# Patient Record
Sex: FEMALE | Race: WHITE | NOT HISPANIC OR LATINO | Employment: FULL TIME | URBAN - METROPOLITAN AREA
[De-identification: names, ages, dates, MRNs, and addresses within clinical notes are randomized per-mention and may not be internally consistent; named-entity substitution may affect disease eponyms.]

---

## 2023-03-15 ENCOUNTER — OFFICE VISIT (OUTPATIENT)
Dept: OBSTETRICS AND GYNECOLOGY | Facility: CLINIC | Age: 38
End: 2023-03-15
Payer: COMMERCIAL

## 2023-03-15 VITALS — WEIGHT: 111.56 LBS | SYSTOLIC BLOOD PRESSURE: 104 MMHG | DIASTOLIC BLOOD PRESSURE: 70 MMHG

## 2023-03-15 DIAGNOSIS — Z11.51 ENCOUNTER FOR SCREENING FOR HUMAN PAPILLOMAVIRUS (HPV): ICD-10-CM

## 2023-03-15 DIAGNOSIS — Z01.419 ENCOUNTER FOR ANNUAL ROUTINE GYNECOLOGICAL EXAMINATION: Primary | ICD-10-CM

## 2023-03-15 DIAGNOSIS — Z12.4 ENCOUNTER FOR PAPANICOLAOU SMEAR FOR CERVICAL CANCER SCREENING: ICD-10-CM

## 2023-03-15 DIAGNOSIS — Z00.00 HEALTH CARE MAINTENANCE: ICD-10-CM

## 2023-03-15 PROCEDURE — 1160F PR REVIEW ALL MEDS BY PRESCRIBER/CLIN PHARMACIST DOCUMENTED: ICD-10-PCS | Mod: CPTII,S$GLB,, | Performed by: OBSTETRICS & GYNECOLOGY

## 2023-03-15 PROCEDURE — 3074F PR MOST RECENT SYSTOLIC BLOOD PRESSURE < 130 MM HG: ICD-10-PCS | Mod: CPTII,S$GLB,, | Performed by: OBSTETRICS & GYNECOLOGY

## 2023-03-15 PROCEDURE — 3074F SYST BP LT 130 MM HG: CPT | Mod: CPTII,S$GLB,, | Performed by: OBSTETRICS & GYNECOLOGY

## 2023-03-15 PROCEDURE — 1159F MED LIST DOCD IN RCRD: CPT | Mod: CPTII,S$GLB,, | Performed by: OBSTETRICS & GYNECOLOGY

## 2023-03-15 PROCEDURE — 1160F RVW MEDS BY RX/DR IN RCRD: CPT | Mod: CPTII,S$GLB,, | Performed by: OBSTETRICS & GYNECOLOGY

## 2023-03-15 PROCEDURE — 99999 PR PBB SHADOW E&M-NEW PATIENT-LVL III: ICD-10-PCS | Mod: PBBFAC,,, | Performed by: OBSTETRICS & GYNECOLOGY

## 2023-03-15 PROCEDURE — 99385 PREV VISIT NEW AGE 18-39: CPT | Mod: S$GLB,,, | Performed by: OBSTETRICS & GYNECOLOGY

## 2023-03-15 PROCEDURE — 99385 PR PREVENTIVE VISIT,NEW,18-39: ICD-10-PCS | Mod: S$GLB,,, | Performed by: OBSTETRICS & GYNECOLOGY

## 2023-03-15 PROCEDURE — 99999 PR PBB SHADOW E&M-NEW PATIENT-LVL III: CPT | Mod: PBBFAC,,, | Performed by: OBSTETRICS & GYNECOLOGY

## 2023-03-15 PROCEDURE — 87624 HPV HI-RISK TYP POOLED RSLT: CPT | Performed by: OBSTETRICS & GYNECOLOGY

## 2023-03-15 PROCEDURE — 3078F DIAST BP <80 MM HG: CPT | Mod: CPTII,S$GLB,, | Performed by: OBSTETRICS & GYNECOLOGY

## 2023-03-15 PROCEDURE — 3078F PR MOST RECENT DIASTOLIC BLOOD PRESSURE < 80 MM HG: ICD-10-PCS | Mod: CPTII,S$GLB,, | Performed by: OBSTETRICS & GYNECOLOGY

## 2023-03-15 PROCEDURE — 1159F PR MEDICATION LIST DOCUMENTED IN MEDICAL RECORD: ICD-10-PCS | Mod: CPTII,S$GLB,, | Performed by: OBSTETRICS & GYNECOLOGY

## 2023-03-15 PROCEDURE — 88175 CYTOPATH C/V AUTO FLUID REDO: CPT | Performed by: OBSTETRICS & GYNECOLOGY

## 2023-03-15 NOTE — PROGRESS NOTES
Chief Complaint: Well Woman Exam     HPI:      Opal Marx is a 37 y.o.  who presents today for well woman exam.  LMP: Patient's last menstrual period was 2023.  No issues, problems, or complaints. Specifically, patient denies abnormal vaginal bleeding, discharge, pelvic pain, urinary problems, or changes in appetite. Ms. Marx is currently sexually active with a single male partner. She declines STD screening today. Patient has regular monthly menses. Patient's last menstrual period was 2023. She is currently using condoms for contraception.     Previous Pap: no abnormalities  (3/21/2023) Last done at . No records to review today.    Gardasil: Has never had, counseled    OB History          2    Para   2    Term   2            AB        Living   2         SAB        IAB        Ectopic        Multiple        Live Births   2           Obstetric Comments   Largest 7lb4oz               GYN History  Age of Menarche:15  Age at first pregnancy:26   Age at first live birth:26  Number of months breastfeedin   Comments: No abnormal pap or STDs       ROS:     GENERAL: Denies unintentional weight gain or weight loss. Feeling well overall.   SKIN: Denies rash or lesions.   HEENT: Denies headaches, or vision changes.   CARDIOVASCULAR: Denies palpitations or chest pain.   RESPIRATORY: Denies shortness of breath or dyspnea on exertion.  BREASTS: Denies pain, lumps, or nipple discharge.   ABDOMEN: Denies abdominal pain, constipation, diarrhea, nausea, vomiting, change in appetite.  URINARY: Denies frequency, dysuria, hematuria.  NEUROLOGIC: Denies syncope or weakness.   PSYCHIATRIC: Denies depression, anxiety or mood swings.    Physical Exam:      PHYSICAL EXAM:  /70 (BP Location: Right arm, Patient Position: Sitting, BP Method: Medium (Manual))   Wt 50.6 kg (111 lb 8.8 oz)   LMP 2023   There is no height or weight on file to calculate BMI.     APPEARANCE: Well nourished,  well developed, in no acute distress.  PSYCH: Appropriate mood and affect.  SKIN: No acne or hirsutism  NECK: Neck symmetric without masses or thyromegaly  NODES: No inguinal, axillary, or supraclavicular lymph node enlargement  ABDOMEN: Soft.  No tenderness or masses.    CARDIOVASCULAR: No edema of peripheral extremities  BREASTS: Symmetrical, no skin changes or visible lesions.  No palpable masses or nipple discharge bilaterally.  PELVIC: Normal external genitalia without lesions.  Normal hair distribution.  Adequate perineal body, normal urethral meatus.  Vagina moist and well rugated without lesions or discharge.  Cervix pink, without lesions, discharge or tenderness.  No significant cystocele or rectocele.  Bimanual exam shows uterus to be normal size, regular, mobile and nontender.  Adnexa without masses or tenderness.      Assessment/Plan:     Encounter for annual routine gynecological examination    Encounter for Papanicolaou smear for cervical cancer screening  -     Liquid-Based Pap Smear, Screening    Encounter for screening for human papillomavirus (HPV)  -     HPV High Risk Genotypes, PCR    Health care maintenance  -     CBC Auto Differential; Future; Expected date: 03/15/2023  -     Comprehensive Metabolic Panel; Future; Expected date: 03/15/2023  -     Lipid Panel; Future; Expected date: 03/15/2023  -     TSH; Future; Expected date: 03/15/2023      RTC 1 year    Counseling:     Patient was counseled today on current ASCCP pap guidelines, the recommendation for yearly pelvic exams, healthy diet and exercise routines, breast self awareness.She is to see her PCP for other health maintenance.     Use of the [facility] Patient Portal discussed and encouraged during today's visit.       Krissy Max MD      As of April 1, 2021, the Cures Act has been passed nationally. This new law requires that all doctors progress notes, lab results, pathology reports and radiology reports be released IMMEDIATELY to the  patient in the patient portal. That means that the results are released to you at the EXACT same time they are released to me. Therefore, with all of the patients that I have I am not able to reply to each patient exactly when the results come in. So there will be a delay from when you see the results to when I see them and have time to come up with a response to send you. Also I only see these results when I am on the computer at work. So if the results come in over the weekend or after 5 pm of a work day, I will not see them until the next business day. As you can tell, this is a challenge as a physician to give every patient the quick response they hope for and deserve. So please be patient! Thanks for understanding, Dr. Max

## 2023-03-16 ENCOUNTER — LAB VISIT (OUTPATIENT)
Dept: LAB | Facility: HOSPITAL | Age: 38
End: 2023-03-16
Attending: OBSTETRICS & GYNECOLOGY
Payer: COMMERCIAL

## 2023-03-16 DIAGNOSIS — Z00.00 HEALTH CARE MAINTENANCE: ICD-10-CM

## 2023-03-16 LAB
ALBUMIN SERPL BCP-MCNC: 4.5 G/DL (ref 3.5–5.2)
ALP SERPL-CCNC: 57 U/L (ref 55–135)
ALT SERPL W/O P-5'-P-CCNC: 9 U/L (ref 10–44)
ANION GAP SERPL CALC-SCNC: 8 MMOL/L (ref 8–16)
AST SERPL-CCNC: 20 U/L (ref 10–40)
BASOPHILS # BLD AUTO: 0.03 K/UL (ref 0–0.2)
BASOPHILS NFR BLD: 0.6 % (ref 0–1.9)
BILIRUB SERPL-MCNC: 0.7 MG/DL (ref 0.1–1)
BUN SERPL-MCNC: 9 MG/DL (ref 6–20)
CALCIUM SERPL-MCNC: 10.7 MG/DL (ref 8.7–10.5)
CHLORIDE SERPL-SCNC: 105 MMOL/L (ref 95–110)
CHOLEST SERPL-MCNC: 230 MG/DL (ref 120–199)
CHOLEST/HDLC SERPL: 2.7 {RATIO} (ref 2–5)
CO2 SERPL-SCNC: 26 MMOL/L (ref 23–29)
CREAT SERPL-MCNC: 0.7 MG/DL (ref 0.5–1.4)
DIFFERENTIAL METHOD: NORMAL
EOSINOPHIL # BLD AUTO: 0.1 K/UL (ref 0–0.5)
EOSINOPHIL NFR BLD: 1.5 % (ref 0–8)
ERYTHROCYTE [DISTWIDTH] IN BLOOD BY AUTOMATED COUNT: 12.5 % (ref 11.5–14.5)
EST. GFR  (NO RACE VARIABLE): >60 ML/MIN/1.73 M^2
GLUCOSE SERPL-MCNC: 81 MG/DL (ref 70–110)
HCT VFR BLD AUTO: 43.3 % (ref 37–48.5)
HDLC SERPL-MCNC: 85 MG/DL (ref 40–75)
HDLC SERPL: 37 % (ref 20–50)
HGB BLD-MCNC: 14.4 G/DL (ref 12–16)
IMM GRANULOCYTES # BLD AUTO: 0.01 K/UL (ref 0–0.04)
IMM GRANULOCYTES NFR BLD AUTO: 0.2 % (ref 0–0.5)
LDLC SERPL CALC-MCNC: 132.4 MG/DL (ref 63–159)
LYMPHOCYTES # BLD AUTO: 1.9 K/UL (ref 1–4.8)
LYMPHOCYTES NFR BLD: 35.5 % (ref 18–48)
MCH RBC QN AUTO: 30.7 PG (ref 27–31)
MCHC RBC AUTO-ENTMCNC: 33.3 G/DL (ref 32–36)
MCV RBC AUTO: 92 FL (ref 82–98)
MONOCYTES # BLD AUTO: 0.4 K/UL (ref 0.3–1)
MONOCYTES NFR BLD: 6.7 % (ref 4–15)
NEUTROPHILS # BLD AUTO: 2.9 K/UL (ref 1.8–7.7)
NEUTROPHILS NFR BLD: 55.5 % (ref 38–73)
NONHDLC SERPL-MCNC: 145 MG/DL
NRBC BLD-RTO: 0 /100 WBC
PLATELET # BLD AUTO: 295 K/UL (ref 150–450)
PMV BLD AUTO: 11.5 FL (ref 9.2–12.9)
POTASSIUM SERPL-SCNC: 4.2 MMOL/L (ref 3.5–5.1)
PROT SERPL-MCNC: 7.5 G/DL (ref 6–8.4)
RBC # BLD AUTO: 4.69 M/UL (ref 4–5.4)
SODIUM SERPL-SCNC: 139 MMOL/L (ref 136–145)
TRIGL SERPL-MCNC: 63 MG/DL (ref 30–150)
TSH SERPL DL<=0.005 MIU/L-ACNC: 2.35 UIU/ML (ref 0.4–4)
WBC # BLD AUTO: 5.24 K/UL (ref 3.9–12.7)

## 2023-03-16 PROCEDURE — 80053 COMPREHEN METABOLIC PANEL: CPT | Performed by: OBSTETRICS & GYNECOLOGY

## 2023-03-16 PROCEDURE — 84443 ASSAY THYROID STIM HORMONE: CPT | Performed by: OBSTETRICS & GYNECOLOGY

## 2023-03-16 PROCEDURE — 85025 COMPLETE CBC W/AUTO DIFF WBC: CPT | Performed by: OBSTETRICS & GYNECOLOGY

## 2023-03-16 PROCEDURE — 80061 LIPID PANEL: CPT | Performed by: OBSTETRICS & GYNECOLOGY

## 2023-03-17 NOTE — PROGRESS NOTES
Please notify that her labs are overall normal. The only thing slightly elevated is her calcium level. Please find out if she is taking any supplements or vitamins with calcium in them and let me know.  Dr. Max

## 2023-03-21 LAB
FINAL PATHOLOGIC DIAGNOSIS: NORMAL
HPV HR 12 DNA SPEC QL NAA+PROBE: NEGATIVE
HPV16 AG SPEC QL: NEGATIVE
HPV18 DNA SPEC QL NAA+PROBE: NEGATIVE
Lab: NORMAL

## 2023-03-22 ENCOUNTER — TELEPHONE (OUTPATIENT)
Dept: OBSTETRICS AND GYNECOLOGY | Facility: CLINIC | Age: 38
End: 2023-03-22
Payer: COMMERCIAL

## 2023-03-22 DIAGNOSIS — Z23 NEED FOR TDAP VACCINATION: Primary | ICD-10-CM

## 2023-03-22 NOTE — TELEPHONE ENCOUNTER
Called and asked if pt was taking anything with Calcium.  Pt is not currently taking anything but mentioned her mother had kidney stones due to elevated Calcium levels.  Also notified pt Pap was normal and neg for HPV.  Pt inquired about iron levels and I advised that blood levels were within range.    Advised that if Dr. Max has any other recommendations regarding elevated Calcium, will let pt know.  Pt verbalized understanding.

## 2023-03-22 NOTE — TELEPHONE ENCOUNTER
"Pt called back to further clarify that her mother's kidney stones was a "side effect" of something more severe.  States her mother was diagnosed with Hyperparathyroidism which is an overactive gland that produces too much Calcium.  Her mother had sx to remove 1-2 of her parathyroid gland last year.    Advised I will keep in touch with her if Dr. Mxa has any further recommendations.  "

## 2023-03-22 NOTE — TELEPHONE ENCOUNTER
----- Message from Krissy Max MD sent at 3/17/2023  5:02 PM CDT -----  Please notify that her labs are overall normal. The only thing slightly elevated is her calcium level. Please find out if she is taking any supplements or vitamins with calcium in them and let me know.  Dr. Max

## 2023-08-24 ENCOUNTER — OFFICE VISIT (OUTPATIENT)
Dept: PRIMARY CARE CLINIC | Facility: CLINIC | Age: 38
End: 2023-08-24
Payer: COMMERCIAL

## 2023-08-24 ENCOUNTER — LAB VISIT (OUTPATIENT)
Dept: LAB | Facility: HOSPITAL | Age: 38
End: 2023-08-24
Attending: STUDENT IN AN ORGANIZED HEALTH CARE EDUCATION/TRAINING PROGRAM
Payer: COMMERCIAL

## 2023-08-24 VITALS
HEART RATE: 66 BPM | BODY MASS INDEX: 19.84 KG/M2 | HEIGHT: 62 IN | OXYGEN SATURATION: 100 % | SYSTOLIC BLOOD PRESSURE: 112 MMHG | WEIGHT: 107.81 LBS | DIASTOLIC BLOOD PRESSURE: 80 MMHG

## 2023-08-24 DIAGNOSIS — D50.9 IRON DEFICIENCY ANEMIA, UNSPECIFIED IRON DEFICIENCY ANEMIA TYPE: ICD-10-CM

## 2023-08-24 DIAGNOSIS — Z82.49 FAMILY HISTORY OF HEART DISEASE: ICD-10-CM

## 2023-08-24 DIAGNOSIS — Z00.00 ANNUAL PHYSICAL EXAM: Primary | ICD-10-CM

## 2023-08-24 DIAGNOSIS — E83.52 SERUM CALCIUM ELEVATED: ICD-10-CM

## 2023-08-24 DIAGNOSIS — Z00.00 ANNUAL PHYSICAL EXAM: ICD-10-CM

## 2023-08-24 DIAGNOSIS — F41.8 SITUATIONAL ANXIETY: ICD-10-CM

## 2023-08-24 LAB
ALBUMIN SERPL BCP-MCNC: 4.6 G/DL (ref 3.5–5.2)
ALP SERPL-CCNC: 65 U/L (ref 55–135)
ALT SERPL W/O P-5'-P-CCNC: 14 U/L (ref 10–44)
ANION GAP SERPL CALC-SCNC: 10 MMOL/L (ref 8–16)
AST SERPL-CCNC: 22 U/L (ref 10–40)
BILIRUB SERPL-MCNC: 0.6 MG/DL (ref 0.1–1)
BUN SERPL-MCNC: 9 MG/DL (ref 6–20)
CALCIUM SERPL-MCNC: 10.8 MG/DL (ref 8.7–10.5)
CHLORIDE SERPL-SCNC: 103 MMOL/L (ref 95–110)
CO2 SERPL-SCNC: 27 MMOL/L (ref 23–29)
CREAT SERPL-MCNC: 0.7 MG/DL (ref 0.5–1.4)
EST. GFR  (NO RACE VARIABLE): >60 ML/MIN/1.73 M^2
ESTIMATED AVG GLUCOSE: 77 MG/DL (ref 68–131)
GLUCOSE SERPL-MCNC: 77 MG/DL (ref 70–110)
HBA1C MFR BLD: 4.3 % (ref 4–5.6)
IRON SERPL-MCNC: 125 UG/DL (ref 30–160)
POTASSIUM SERPL-SCNC: 4.2 MMOL/L (ref 3.5–5.1)
PROT SERPL-MCNC: 7.8 G/DL (ref 6–8.4)
PTH-INTACT SERPL-MCNC: 77.1 PG/ML (ref 9–77)
SATURATED IRON: 29 % (ref 20–50)
SODIUM SERPL-SCNC: 140 MMOL/L (ref 136–145)
TOTAL IRON BINDING CAPACITY: 428 UG/DL (ref 250–450)
TRANSFERRIN SERPL-MCNC: 289 MG/DL (ref 200–375)

## 2023-08-24 PROCEDURE — 82397 CHEMILUMINESCENT ASSAY: CPT | Performed by: STUDENT IN AN ORGANIZED HEALTH CARE EDUCATION/TRAINING PROGRAM

## 2023-08-24 PROCEDURE — 3008F BODY MASS INDEX DOCD: CPT | Mod: CPTII,S$GLB,, | Performed by: STUDENT IN AN ORGANIZED HEALTH CARE EDUCATION/TRAINING PROGRAM

## 2023-08-24 PROCEDURE — 1159F PR MEDICATION LIST DOCUMENTED IN MEDICAL RECORD: ICD-10-PCS | Mod: CPTII,S$GLB,, | Performed by: STUDENT IN AN ORGANIZED HEALTH CARE EDUCATION/TRAINING PROGRAM

## 2023-08-24 PROCEDURE — 99395 PREV VISIT EST AGE 18-39: CPT | Mod: S$GLB,,, | Performed by: STUDENT IN AN ORGANIZED HEALTH CARE EDUCATION/TRAINING PROGRAM

## 2023-08-24 PROCEDURE — 3074F SYST BP LT 130 MM HG: CPT | Mod: CPTII,S$GLB,, | Performed by: STUDENT IN AN ORGANIZED HEALTH CARE EDUCATION/TRAINING PROGRAM

## 2023-08-24 PROCEDURE — 83970 ASSAY OF PARATHORMONE: CPT | Performed by: STUDENT IN AN ORGANIZED HEALTH CARE EDUCATION/TRAINING PROGRAM

## 2023-08-24 PROCEDURE — 3008F PR BODY MASS INDEX (BMI) DOCUMENTED: ICD-10-PCS | Mod: CPTII,S$GLB,, | Performed by: STUDENT IN AN ORGANIZED HEALTH CARE EDUCATION/TRAINING PROGRAM

## 2023-08-24 PROCEDURE — 99999 PR PBB SHADOW E&M-EST. PATIENT-LVL III: ICD-10-PCS | Mod: PBBFAC,,, | Performed by: STUDENT IN AN ORGANIZED HEALTH CARE EDUCATION/TRAINING PROGRAM

## 2023-08-24 PROCEDURE — 1160F RVW MEDS BY RX/DR IN RCRD: CPT | Mod: CPTII,S$GLB,, | Performed by: STUDENT IN AN ORGANIZED HEALTH CARE EDUCATION/TRAINING PROGRAM

## 2023-08-24 PROCEDURE — 3044F HG A1C LEVEL LT 7.0%: CPT | Mod: CPTII,S$GLB,, | Performed by: STUDENT IN AN ORGANIZED HEALTH CARE EDUCATION/TRAINING PROGRAM

## 2023-08-24 PROCEDURE — 3074F PR MOST RECENT SYSTOLIC BLOOD PRESSURE < 130 MM HG: ICD-10-PCS | Mod: CPTII,S$GLB,, | Performed by: STUDENT IN AN ORGANIZED HEALTH CARE EDUCATION/TRAINING PROGRAM

## 2023-08-24 PROCEDURE — 1160F PR REVIEW ALL MEDS BY PRESCRIBER/CLIN PHARMACIST DOCUMENTED: ICD-10-PCS | Mod: CPTII,S$GLB,, | Performed by: STUDENT IN AN ORGANIZED HEALTH CARE EDUCATION/TRAINING PROGRAM

## 2023-08-24 PROCEDURE — 36415 COLL VENOUS BLD VENIPUNCTURE: CPT | Performed by: STUDENT IN AN ORGANIZED HEALTH CARE EDUCATION/TRAINING PROGRAM

## 2023-08-24 PROCEDURE — 83540 ASSAY OF IRON: CPT | Performed by: STUDENT IN AN ORGANIZED HEALTH CARE EDUCATION/TRAINING PROGRAM

## 2023-08-24 PROCEDURE — 84466 ASSAY OF TRANSFERRIN: CPT | Performed by: STUDENT IN AN ORGANIZED HEALTH CARE EDUCATION/TRAINING PROGRAM

## 2023-08-24 PROCEDURE — 99395 PR PREVENTIVE VISIT,EST,18-39: ICD-10-PCS | Mod: S$GLB,,, | Performed by: STUDENT IN AN ORGANIZED HEALTH CARE EDUCATION/TRAINING PROGRAM

## 2023-08-24 PROCEDURE — 80053 COMPREHEN METABOLIC PANEL: CPT | Performed by: STUDENT IN AN ORGANIZED HEALTH CARE EDUCATION/TRAINING PROGRAM

## 2023-08-24 PROCEDURE — 3079F DIAST BP 80-89 MM HG: CPT | Mod: CPTII,S$GLB,, | Performed by: STUDENT IN AN ORGANIZED HEALTH CARE EDUCATION/TRAINING PROGRAM

## 2023-08-24 PROCEDURE — 99999 PR PBB SHADOW E&M-EST. PATIENT-LVL III: CPT | Mod: PBBFAC,,, | Performed by: STUDENT IN AN ORGANIZED HEALTH CARE EDUCATION/TRAINING PROGRAM

## 2023-08-24 PROCEDURE — 3079F PR MOST RECENT DIASTOLIC BLOOD PRESSURE 80-89 MM HG: ICD-10-PCS | Mod: CPTII,S$GLB,, | Performed by: STUDENT IN AN ORGANIZED HEALTH CARE EDUCATION/TRAINING PROGRAM

## 2023-08-24 PROCEDURE — 1159F MED LIST DOCD IN RCRD: CPT | Mod: CPTII,S$GLB,, | Performed by: STUDENT IN AN ORGANIZED HEALTH CARE EDUCATION/TRAINING PROGRAM

## 2023-08-24 PROCEDURE — 3044F PR MOST RECENT HEMOGLOBIN A1C LEVEL <7.0%: ICD-10-PCS | Mod: CPTII,S$GLB,, | Performed by: STUDENT IN AN ORGANIZED HEALTH CARE EDUCATION/TRAINING PROGRAM

## 2023-08-24 PROCEDURE — 83036 HEMOGLOBIN GLYCOSYLATED A1C: CPT | Performed by: STUDENT IN AN ORGANIZED HEALTH CARE EDUCATION/TRAINING PROGRAM

## 2023-08-24 RX ORDER — PROPRANOLOL HYDROCHLORIDE 10 MG/1
10 TABLET ORAL 3 TIMES DAILY PRN
Qty: 90 TABLET | Refills: 11 | Status: SHIPPED | OUTPATIENT
Start: 2023-08-24 | End: 2024-08-23

## 2023-08-24 NOTE — PROGRESS NOTES
SUBJECTIVE     Chief Complaint   Patient presents with    Establish Care    Annual Exam       HPI  Opal Marx is a 38 y.o. female with medical diagnoses as listed in the medical history and problem list that presents for annual exam. Pt is establishing care with me today.    Pt is UTD on age appropriate CA screening.    Family, social, surgical Hx reviewed     ESTHELA: due for repeat level    Situational anxiety: Interested in prn medication for stressful circumstances. Does not experience daily sxs.    Serum Ca elevated on recent labwork     Health Maintenance         Date Due Completion Date    Hepatitis C Screening Never done ---    HIV Screening Never done ---    TETANUS VACCINE 08/09/2014 8/9/2004    COVID-19 Vaccine (2 - Booster for Lryic series) 10/09/2021 8/14/2021    Influenza Vaccine (1) 09/01/2023 ---    Cervical Cancer Screening 03/15/2028 3/15/2023            PAST MEDICAL HISTORY:  History reviewed. No pertinent past medical history.    PAST SURGICAL HISTORY:  Past Surgical History:   Procedure Laterality Date    DILATION AND CURETTAGE OF UTERUS  2014       SOCIAL HISTORY:  Social History     Socioeconomic History    Marital status:    Occupational History    Occupation:    Tobacco Use    Smoking status: Never     Passive exposure: Never    Smokeless tobacco: Never   Substance and Sexual Activity    Alcohol use: Yes    Drug use: Never    Sexual activity: Yes     Partners: Male     Birth control/protection: None       FAMILY HISTORY:  Family History   Problem Relation Age of Onset    Hyperparathyroidism Mother     Nephrolithiasis Mother     Heart attack Father 48    Prostate cancer Maternal Grandfather         started as prostate mets to colon    Colon cancer Maternal Grandfather     Crohn's disease Cousin     Breast cancer Neg Hx     Ovarian cancer Neg Hx     Uterine cancer Neg Hx        ALLERGIES AND MEDICATIONS: updated and reviewed.  Review of patient's allergies  "indicates:   Allergen Reactions    Meperidine Rash     Current Outpatient Medications   Medication Sig Dispense Refill    propranoloL (INDERAL) 10 MG tablet Take 1 tablet (10 mg total) by mouth 3 (three) times daily as needed (anxiety). 90 tablet 11     No current facility-administered medications for this visit.       ROS  Review of Systems   Constitutional:  Negative for fever and weight loss.   Respiratory:  Negative for cough and shortness of breath.    Cardiovascular:  Negative for chest pain and palpitations.   Gastrointestinal:  Negative for abdominal pain, constipation, diarrhea, nausea and vomiting.   Genitourinary:  Negative for dysuria.   Musculoskeletal:  Negative for back pain and joint pain.   Skin:  Negative for rash.   Neurological:  Negative for dizziness, weakness and headaches.   Psychiatric/Behavioral:  Negative for depression. The patient is not nervous/anxious.          OBJECTIVE     Physical Exam  Vitals:    08/24/23 1029   BP: 112/80   Pulse: 66    Body mass index is 19.72 kg/m².  Weight: 48.9 kg (107 lb 12.9 oz)   Height: 5' 2" (157.5 cm)     Physical Exam  HENT:      Head: Normocephalic and atraumatic.      Nose: Nose normal.      Mouth/Throat:      Mouth: Mucous membranes are moist.      Pharynx: Oropharynx is clear.   Eyes:      Extraocular Movements: Extraocular movements intact.      Conjunctiva/sclera: Conjunctivae normal.      Pupils: Pupils are equal, round, and reactive to light.   Cardiovascular:      Rate and Rhythm: Normal rate and regular rhythm.   Pulmonary:      Effort: Pulmonary effort is normal.      Breath sounds: Normal breath sounds.   Musculoskeletal:         General: No swelling. Normal range of motion.      Cervical back: Normal range of motion.      Right lower leg: No edema.      Left lower leg: No edema.   Skin:     General: Skin is warm.      Findings: No lesion or rash.   Neurological:      General: No focal deficit present.      Mental Status: She is alert and " oriented to person, place, and time.      Motor: No weakness.   Psychiatric:         Mood and Affect: Mood normal.         Thought Content: Thought content normal.               ASSESSMENT     38 y.o. female with     1. Annual physical exam    2. Serum calcium elevated    3. Family history of heart disease    4. Iron deficiency anemia, unspecified iron deficiency anemia type    5. Situational anxiety        PLAN:     1. Annual physical exam  -     COMPREHENSIVE METABOLIC PANEL; Future; Expected date: 08/24/2023    2. Serum calcium elevated  -     COMPREHENSIVE METABOLIC PANEL; Future; Expected date: 08/24/2023  -     PTH, intact; Future; Expected date: 08/24/2023  -     PTH-RELATED PEPTIDE; Future; Expected date: 08/24/2023    3. Family history of heart disease  -     HEMOGLOBIN A1C; Future; Expected date: 08/24/2023    4. Iron deficiency anemia, unspecified iron deficiency anemia type  -     Iron and TIBC; Future; Expected date: 08/24/2023    5. Situational anxiety  -     propranoloL (INDERAL) 10 MG tablet; Take 1 tablet (10 mg total) by mouth 3 (three) times daily as needed (anxiety).  Dispense: 90 tablet; Refill: 11        Discussed age and gender appropriate screenings at this visit and encouraged a healthy diet low in simple carbohydrates, and increased physical activity.  Counseled on medically appropriate vaccines based on age and current health condition.  Screening test reviewed and discussed with patient.      RTC in 1 year     Sakshi Kirkland MD

## 2023-08-30 LAB — PTH RELATED PROT SERPL-SCNC: 0.7 PMOL/L

## 2023-09-06 ENCOUNTER — PATIENT MESSAGE (OUTPATIENT)
Dept: PRIMARY CARE CLINIC | Facility: CLINIC | Age: 38
End: 2023-09-06
Payer: COMMERCIAL

## 2023-09-06 DIAGNOSIS — E83.52 SERUM CALCIUM ELEVATED: Primary | ICD-10-CM

## 2023-09-21 ENCOUNTER — TELEPHONE (OUTPATIENT)
Dept: PRIMARY CARE CLINIC | Facility: CLINIC | Age: 38
End: 2023-09-21
Payer: COMMERCIAL

## 2023-09-21 NOTE — TELEPHONE ENCOUNTER
----- Message from Sakshi Kirkland MD sent at 9/21/2023  2:32 PM CDT -----  Please let her know, if Dr. Hoffmann is not taking new patients we cannot schedule with him.  ----- Message -----  From: Latosha Jean Baptiste MA  Sent: 9/21/2023   2:14 PM CDT  To: Sakshi Kirkland MD    [facility]    ----- Message -----  From: Celia Gonzalez  Sent: 9/21/2023   2:08 PM CDT  To: Isael SALCEDO. Staff    Pt wanted to see Dr. Hoffmann and no one else. I sent a msg to his staff on 9/7. She's waiting on them to call back.

## 2023-09-21 NOTE — TELEPHONE ENCOUNTER
Informed pt that Dr. Hoffmann is not taking new pts, pt verbalized understanding and is okay with scheduling with another endo provider who is taking new pts. Can you help pt get scheduled? Thanks!

## 2024-01-17 ENCOUNTER — OFFICE VISIT (OUTPATIENT)
Dept: ENDOCRINOLOGY | Facility: CLINIC | Age: 39
End: 2024-01-17
Payer: COMMERCIAL

## 2024-01-17 ENCOUNTER — LAB VISIT (OUTPATIENT)
Dept: LAB | Facility: HOSPITAL | Age: 39
End: 2024-01-17
Attending: INTERNAL MEDICINE
Payer: COMMERCIAL

## 2024-01-17 VITALS
OXYGEN SATURATION: 99 % | WEIGHT: 108.56 LBS | SYSTOLIC BLOOD PRESSURE: 124 MMHG | BODY MASS INDEX: 19.98 KG/M2 | DIASTOLIC BLOOD PRESSURE: 82 MMHG | HEIGHT: 62 IN | HEART RATE: 102 BPM

## 2024-01-17 DIAGNOSIS — E21.5 PARATHYROID RELATED HYPERCALCEMIA: Primary | ICD-10-CM

## 2024-01-17 DIAGNOSIS — E83.52 SERUM CALCIUM ELEVATED: ICD-10-CM

## 2024-01-17 DIAGNOSIS — E21.3 HYPERPARATHYROIDISM: ICD-10-CM

## 2024-01-17 DIAGNOSIS — E83.52 PARATHYROID RELATED HYPERCALCEMIA: Primary | ICD-10-CM

## 2024-01-17 LAB
25(OH)D3+25(OH)D2 SERPL-MCNC: 21 NG/ML (ref 30–96)
PHOSPHATE SERPL-MCNC: 2.3 MG/DL (ref 2.7–4.5)
PTH-INTACT SERPL-MCNC: 82 PG/ML (ref 9–77)

## 2024-01-17 PROCEDURE — 36415 COLL VENOUS BLD VENIPUNCTURE: CPT | Performed by: INTERNAL MEDICINE

## 2024-01-17 PROCEDURE — 1159F MED LIST DOCD IN RCRD: CPT | Mod: CPTII,S$GLB,, | Performed by: INTERNAL MEDICINE

## 2024-01-17 PROCEDURE — 82306 VITAMIN D 25 HYDROXY: CPT | Performed by: INTERNAL MEDICINE

## 2024-01-17 PROCEDURE — 1160F RVW MEDS BY RX/DR IN RCRD: CPT | Mod: CPTII,S$GLB,, | Performed by: INTERNAL MEDICINE

## 2024-01-17 PROCEDURE — 99999 PR PBB SHADOW E&M-EST. PATIENT-LVL III: CPT | Mod: PBBFAC,,, | Performed by: INTERNAL MEDICINE

## 2024-01-17 PROCEDURE — 3008F BODY MASS INDEX DOCD: CPT | Mod: CPTII,S$GLB,, | Performed by: INTERNAL MEDICINE

## 2024-01-17 PROCEDURE — 84100 ASSAY OF PHOSPHORUS: CPT | Performed by: INTERNAL MEDICINE

## 2024-01-17 PROCEDURE — 83970 ASSAY OF PARATHORMONE: CPT | Performed by: INTERNAL MEDICINE

## 2024-01-17 PROCEDURE — 3074F SYST BP LT 130 MM HG: CPT | Mod: CPTII,S$GLB,, | Performed by: INTERNAL MEDICINE

## 2024-01-17 PROCEDURE — 99204 OFFICE O/P NEW MOD 45 MIN: CPT | Mod: S$GLB,,, | Performed by: INTERNAL MEDICINE

## 2024-01-17 PROCEDURE — 3079F DIAST BP 80-89 MM HG: CPT | Mod: CPTII,S$GLB,, | Performed by: INTERNAL MEDICINE

## 2024-01-17 NOTE — ASSESSMENT & PLAN NOTE
We will recheck labs today including a 24 hour urine.  Reviewed the differential is primary hyperparathyroidism versus FH H. urine studies will determine next steps which will either be imaging or genetic testing.  Reviewed that based on her age she would be a surgical candidate if primary hyperparathyroidism is proven.

## 2024-01-17 NOTE — PROGRESS NOTES
"Subjective:      Patient ID: Opal Marx is a 38 y.o.    Chief Complaint:  PTH mediated hypercalcemia    History of Present Illness  With regards to the hypercalcemia      Was found to have hypercalcemia on routine labs - first noted:   Pth was checked:  And elevated    She denied current or past lithium use  Denies HCTZ use.    Parathyroid imaging: none   Thyroid ultrasound: none     Daily intake of calcium is: healthy diet   Taking vitamin D: healthy diet     Denies constipation, depression, polyuria     Last bmd was:  none   Denies fractures, height loss or kidney stones      She does note that her mother had primary hyperparathyroidism.  Had kidney stones.  Is status post parathyroidectomy.       ROS:   As above    Objective:     /82   Pulse 102   Ht 5' 2" (1.575 m)   Wt 49.2 kg (108 lb 9.2 oz)   SpO2 99%   BMI 19.86 kg/m²     Body mass index is 19.86 kg/m².      Physical Exam           Lab Review:   Lab Results   Component Value Date    HGBA1C 4.3 08/24/2023     Lab Results   Component Value Date    CHOL 230 (H) 03/16/2023    HDL 85 (H) 03/16/2023    LDLCALC 132.4 03/16/2023    TRIG 63 03/16/2023    CHOLHDL 37.0 03/16/2023     Lab Results   Component Value Date     08/24/2023    K 4.2 08/24/2023     08/24/2023    CO2 27 08/24/2023    GLU 77 08/24/2023    BUN 9 08/24/2023    CREATININE 0.7 08/24/2023    CALCIUM 10.8 (H) 08/24/2023    PROT 7.8 08/24/2023    ALBUMIN 4.6 08/24/2023    BILITOT 0.6 08/24/2023    ALKPHOS 65 08/24/2023    AST 22 08/24/2023    ALT 14 08/24/2023    ANIONGAP 10 08/24/2023    TSH 2.347 03/16/2023     No results found for: "[id number]"  Lab Results   Component Value Date    WBC 5.24 03/16/2023    HGB 14.4 03/16/2023    HCT 43.3 03/16/2023    MCV 92 03/16/2023     03/16/2023           Assessment and Plan     Parathyroid related hypercalcemia  We will recheck labs today including a 24 hour urine.  Reviewed the differential is primary hyperparathyroidism " versus FH H. urine studies will determine next steps which will either be imaging or genetic testing.  Reviewed that based on her age she would be a surgical candidate if primary hyperparathyroidism is proven.      Virtual visit in 3 months

## 2024-01-24 ENCOUNTER — LAB VISIT (OUTPATIENT)
Dept: LAB | Facility: HOSPITAL | Age: 39
End: 2024-01-24
Attending: INTERNAL MEDICINE
Payer: COMMERCIAL

## 2024-01-24 DIAGNOSIS — E83.52 SERUM CALCIUM ELEVATED: ICD-10-CM

## 2024-01-24 DIAGNOSIS — E21.3 HYPERPARATHYROIDISM: ICD-10-CM

## 2024-01-24 LAB
CALCIUM 24H UR-MRATE: 9 MG/HR (ref 4–12)
CALCIUM UR-MCNC: 16.7 MG/DL (ref 0–15)
CALCIUM URINE (MG/SPEC): 219 MG/SPEC
CREAT 24H UR-MRATE: 41.5 MG/HR (ref 40–75)
CREAT UR-MCNC: 76 MG/DL (ref 15–325)
CREATININE, URINE (MG/SPEC): 995.6 MG/SPEC
URINE COLLECTION DURATION: 24 HR
URINE COLLECTION DURATION: 24 HR
URINE VOLUME: 1310 ML
URINE VOLUME: 1310 ML

## 2024-01-24 PROCEDURE — 82570 ASSAY OF URINE CREATININE: CPT | Performed by: INTERNAL MEDICINE

## 2024-01-24 PROCEDURE — 82340 ASSAY OF CALCIUM IN URINE: CPT | Performed by: INTERNAL MEDICINE

## 2024-01-29 ENCOUNTER — PATIENT MESSAGE (OUTPATIENT)
Dept: ENDOCRINOLOGY | Facility: CLINIC | Age: 39
End: 2024-01-29
Payer: COMMERCIAL

## 2024-01-29 DIAGNOSIS — E21.3 HYPERPARATHYROIDISM, UNSPECIFIED: ICD-10-CM

## 2024-01-29 DIAGNOSIS — E21.0 PRIMARY HYPERPARATHYROIDISM: Primary | ICD-10-CM

## 2024-02-07 ENCOUNTER — CLINICAL SUPPORT (OUTPATIENT)
Dept: ENDOCRINOLOGY | Facility: CLINIC | Age: 39
End: 2024-02-07
Payer: COMMERCIAL

## 2024-02-07 DIAGNOSIS — E21.0 PRIMARY HYPERPARATHYROIDISM: Primary | ICD-10-CM

## 2024-02-07 PROCEDURE — G2211 COMPLEX E/M VISIT ADD ON: HCPCS | Mod: 95,,, | Performed by: INTERNAL MEDICINE

## 2024-02-07 PROCEDURE — 99214 OFFICE O/P EST MOD 30 MIN: CPT | Mod: 95,,, | Performed by: INTERNAL MEDICINE

## 2024-02-07 NOTE — ASSESSMENT & PLAN NOTE
We will refer to our endocrine surgeon but she is also considering the [facility].  We will check baseline bone density.  We will hold off on parathyroid imaging specifically until she meets with Dr Kamara.  Based on her young age and family history will order [facility] Hereditary Hyperparathyroidism Panel, test code 85137

## 2024-02-07 NOTE — Clinical Note
Bmd asap  Hi Monica, this patient has primary hyperparathyroidism.  She has lots of questions about using you versus the [facility].  I have held off on ordering any parathyroid specific imaging until you talk to her.  In addition, since she is 38 and her mom has primary hyperparathyroidism I was considering genetic testing.  What areyour thoughts?

## 2024-02-07 NOTE — PROGRESS NOTES
Subjective:      Patient ID: Opal Marx is a 38 y.o.    Chief Complaint:  Primary hyperparathyroidism    History of Present Illness  With regards to the primary hyperparathyroidism  Was found to have hypercalcemia on routine labs - first noted:   Pth was checked:  And elevated  24 hour urine confirm the diagnosis    She denied current or past lithium use  Denies HCTZ use.    Parathyroid imaging: none   Thyroid ultrasound: none     Daily intake of calcium is: healthy diet   Taking vitamin D: healthy diet     Denies constipation, depression, polyuria     Last bmd was:  none   Denies fractures, height loss or kidney stones      She does note that her mother had primary hyperparathyroidism.  Had kidney stones.  Is status post parathyroidectomy.     No FH pituitary, adrenal, jaw tumors   She has 2 daughters     ROS:   As above    Objective:     There were no vitals taken for this visit.    There is no height or weight on file to calculate BMI.      Physical Exam  Constitutional:       Appearance: Normal appearance.   Psychiatric:         Attention and Perception: Attention normal.         Mood and Affect: Mood normal.         Speech: Speech normal.         Behavior: Behavior normal.         Thought Content: Thought content normal.         Judgment: Judgment normal.                Lab Review:   Lab Results   Component Value Date    HGBA1C 4.3 08/24/2023     Lab Results   Component Value Date    CHOL 230 (H) 03/16/2023    HDL 85 (H) 03/16/2023    LDLCALC 132.4 03/16/2023    TRIG 63 03/16/2023    CHOLHDL 37.0 03/16/2023     Lab Results   Component Value Date     08/24/2023    K 4.2 08/24/2023     08/24/2023    CO2 27 08/24/2023    GLU 77 08/24/2023    BUN 9 08/24/2023    CREATININE 0.7 08/24/2023    CALCIUM 10.8 (H) 08/24/2023    PROT 7.8 08/24/2023    ALBUMIN 4.6 08/24/2023    BILITOT 0.6 08/24/2023    ALKPHOS 65 08/24/2023    AST 22 08/24/2023    ALT 14 08/24/2023    ANIONGAP 10 08/24/2023    TSH 2.347  03/16/2023     Vit D, 25-Hydroxy   Date Value Ref Range Status   01/17/2024 21 (L) 30 - 96 ng/mL Final     Comment:     Vitamin D deficiency.........<10 ng/mL                              Vitamin D insufficiency......10-29 ng/mL       Vitamin D sufficiency........> or equal to 30 ng/mL  Vitamin D toxicity............>100 ng/mL       Lab Results   Component Value Date    WBC 5.24 03/16/2023    HGB 14.4 03/16/2023    HCT 43.3 03/16/2023    MCV 92 03/16/2023     03/16/2023           Assessment and Plan     Primary hyperparathyroidism  We will refer to our endocrine surgeon but she is also considering the [facility].  We will check baseline bone density.  We will hold off on parathyroid imaging specifically until she meets with Dr Kamara.  Based on her young age and family history will order [facility] Hereditary Hyper[facility]athyroidism Panel, test code 64983     Genetic testing  Refer to surgery  Bone density  Hold off on parathyroid imaging since she may opt to utilize the [facility]    The patient location is: [place]  The chief complaint leading to consultation is: above     Visit type: audiovisual    Level of service is based on medical decision-making and not time      Each patient to whom he or she provides medical services by telemedicine is:  (1) informed of the relationship between the physician and patient and the respective role of any other health care provider with respect to management of the patient; and (2) notified that he or she may decline to receive medical services by telemedicine and may withdraw from such care at any time.

## 2024-02-07 NOTE — PATIENT INSTRUCTIONS
Thank you for completing a virtual visit with me!     Per our conversation, I will put in a referral to our endocrine surgery surgeon Dr. Briana Kamara.  Her office will reach out to you to set up a visit.  In the interim, I will order a baseline bone density of your forearm hip and spine.  I will hold off on any specific parathyroid testing.     In addition, I spoke to my colleague and he agrees with sending a hereditary panel.  I will put in the orders for the blood draw but please research it 1st to  ensure you are comfortable with the plan..  It is called the [facility] Hereditary Hyperparathyroidism Panel, test code 75499.         Please let me know if you have any other questions.    Thank you,  Lola Bryant MD

## 2024-02-20 ENCOUNTER — PATIENT MESSAGE (OUTPATIENT)
Dept: SURGERY | Facility: CLINIC | Age: 39
End: 2024-02-20
Payer: COMMERCIAL

## 2024-03-15 ENCOUNTER — TELEPHONE (OUTPATIENT)
Dept: SURGERY | Facility: CLINIC | Age: 39
End: 2024-03-15
Payer: COMMERCIAL

## 2024-03-15 NOTE — TELEPHONE ENCOUNTER
Placed call to pt. Informed pt Dr Kamara is still in sx and is running late. Pt states she does not want to wait to long. Informed pt her appt is r/s to Monday 3/18/24 @ 11 am. Facility address. Pt acknowledged her new appt date & time.

## 2024-03-17 NOTE — PROGRESS NOTES
Endocrine Surgery History & Physical     REFERRING PROVIDER: Lola Bryant MD    REASON FOR VISIT: Hyperparathyroidism    HPI: Opal Marx is a 38 y.o. female patient with no significant medical history who presents in consultation for primary hyperparathyroidism.  Suspicion for hyperparathyroidism was raised on routine laboratory testing to have elevated calcium levels. She has a family history of hyperparathyroidism and nephrolithiasis in her mother who underwent parathyroidectomy two years ago.     Details of the workup are as follows:     Recent laboratory studies:  Hypercalcemia noted since 3/16/23  Max calcium elevation to 10.8 mg/dL  Parathyroid hormone levels mildly elevated with mild hypercalcemia  Most recent PTH level was 82 with a corresponding calcium of 10.8  Phosphorus: 2.3  Vitamin D: 21 in 1/17/24  24 hour urine calcium: 219 mg/24 hours, Benign Familial Hypocalciuric Hypercalcemia unlikely    Medications:  Vitamin D supplementation: yes, 2000 IU D3 daily  Lithium, thiazide diuretics: none  Calcium supplements or calcimimetic: none    Symptoms:   The patient reports the following: []musculoskeletal pain, []fractures, []nephrolithiasis, []GERD, []peptic ulcer disease, []abdominal pain, []polydipsia, []polyuria, []pruritis  The patient reports the following neurocognitive symptoms: []brain fog, []concentration difficulties, []fatigue, []forgetfulness, []mood/psychiatric disturbances  The patient dysphagia, globus sensation, compression symptoms, or anterior neck pain.  The patient  hoarseness, voice changes or increased need to clear the throat.  Bone mineral density: []Osteoporosis []Osteopenia []Normal bone density.     Surgical risk factors:  Risk of concurrent thyroid disease low  Ultrasound of the thyroid none   History of neck radiation: none  Prior neck surgery: none  Cardiovascular risk: No recent ECHO, otherwise healthy  Antiplatelet therapy and anticoagulation: none    Family  history:  Family history of hyperparathyroidism in mother, who underwent parathyroidectomy for kidney stones   No family history of endocrine cancers including pituitary tumors, pancreatic neuroendocrine tumors, medullary thyroid cancer or pheochromocytoma/paraganglioma.     Localization studies done prior to this visit:  Ultrasound: not obtained yet  Nuclear Medicine Parathyroid Scan: not obtained  4D-CT Parathyroid scan: not obtained    LABORATORY STUDIES:  I personally and independently reviewed relevant lab test results, including the following:    Lab Results   Component Value Date    [id number] 21 (L) 01/17/2024    TSH 2.347 03/16/2023         PAST MEDICAL HISTORY:  Patient Active Problem List   Diagnosis    Primary hyperparathyroidism    Vitamin D insufficiency         PAST SURGICAL HISTORY:  Past Surgical History:   Procedure Laterality Date    DILATION AND CURETTAGE OF UTERUS  2014        MEDICATIONS:  Current Outpatient Medications   Medication Sig Dispense Refill    propranoloL (INDERAL) 10 MG tablet Take 1 tablet (10 mg total) by mouth 3 (three) times daily as needed (anxiety). 90 tablet 11     No current facility-administered medications for this visit.       ALLERGIES:  Review of patient's allergies indicates:   Allergen Reactions    Meperidine Rash       SOCIAL HISTORY:  Social History     Socioeconomic History    Marital status:    Occupational History    Occupation:    Tobacco Use    Smoking status: Never     Passive exposure: Never    Smokeless tobacco: Never   Substance and Sexual Activity    Alcohol use: Yes    Drug use: Never    Sexual activity: Yes     Partners: Male     Birth control/protection: None     Social Determinants of Health     Financial Resource Strain: Low Risk  (2/7/2024)    Overall Financial Resource Strain (CARDIA)     Difficulty of Paying Living Expenses: Not very hard   Food Insecurity: No Food Insecurity (2/7/2024)    Hunger Vital Sign     Worried  "About Running Out of Food in the Last Year: Never true     Ran Out of Food in the Last Year: Never true   Transportation Needs: No Transportation Needs (2/7/2024)    PRAPARE - Transportation     Lack of Transportation (Medical): No     Lack of Transportation (Non-Medical): No   Physical Activity: Insufficiently Active (2/7/2024)    Exercise Vital Sign     Days of Exercise per Week: 2 days     Minutes of Exercise per Session: 20 min   Stress: Stress Concern Present (2/7/2024)    [employer] [place] [employer] - Occupational Stress Questionnaire     Feeling of Stress : To some extent   Social Connections: Unknown (2/7/2024)    Social Connection and Isolation Panel [NHANES]     Frequency of Communication with Friends and Family: More than three times a week     Frequency of Social Gatherings with Friends and Family: More than three times a week     Active Member of Clubs or Organizations: No     Attends Club or Organization Meetings: Never     Marital Status:    Housing Stability: Low Risk  (2/7/2024)    Housing Stability Vital Sign     Unable to Pay for Housing in the Last Year: No     Number of Places Lived in the Last Year: 1     Unstable Housing in the Last Year: No         FAMILY HISTORY:  Family History   Problem Relation Age of Onset    Hyperparathyroidism Mother     Nephrolithiasis Mother     Heart attack Father 48    Prostate cancer Maternal Grandfather         started as prostate mets to colon    Colon cancer Maternal Grandfather     Crohn's disease Cousin     Breast cancer Neg Hx     Ovarian cancer Neg Hx     Uterine cancer Neg Hx         REVIEW OF SYSTEMS:  A detailed review of systems has been reviewed with the patient, pertinent positives and negatives are presented in the note and is otherwise negative.    PHYSICAL EXAMINATION:  Vital Signs: BP 90/70   Ht 5' 2" (1.575 m)   Wt 48.5 kg (107 lb 0.5 oz)   LMP 03/08/2024   BMI 19.58 kg/m²     Constitutional: well-developed, " well-nourished, no acute distress   HENT:  lid lag,  exophthalmos, no scleral icterus, moist mucous membranes, good dentition  Neck: supple, trachea in midline, thyroid is soft and moves well with swallowing, no nodes palpable, adequate neck extension  Heme/Lymph: no cervical or supraclavicular lymphadenopathy  Respiratory: normal respiratory effort, no wheezes or stridor  Cardiovascular: regular rate and rhythm  Extremities: no edema  Skin: warm and dry, no rashes  Neurologic: no resting tremor of outstretched hands, voice strong  Vascular: radial pulses palpable normal bilaterally  Psychiatric: affect normal    IMAGING STUDIES:  No imaging studies have been obtained yet. Patient still deciding on whether she'd like to use [facility] or another hospital.     IMPRESSION:  I had the pleasure of seeing Ms. Marx in Endocrine Surgical consultation regarding the biochemical diagnosis of primary hyperparathyroidism.     We discussed that hyperparathyroidism can compromise bone and cardiovascular health. In addition to classic symptoms such as kidney stones and bone loss, hyperparathyroidism can be associated with multiple symptoms including fatigue, depression, memory loss, pruritis, polyuria, nocturia, constipation, polydipsia, and musculoskeletal aches and pains. Hyperparathyroidism can also worsen hypertension.  I discussed the implications of non-operative observation as well as the standard of care surgical treatment of primary hyperparathyroidism.  Based on the current clinical findings and a thorough discussion about the risks, benefits, and alternatives, the patient elected to proceed with parathyroidectomy.      We extensively discussed the pros and cons for surgical intervention, in this case parathyroidectomy with intraoperative parathyroid hormone monitoring.  We discussed that in the majority of cases, a single adenoma is the culprit gland. However, multigland disease is possible and multigland disease has a  lower likelihood of radiographic localization.  Parathyroidectomy for single gland disease is a same day surgery while four-gland parathyroid exploration may require an overnight stay. We discussed that in all cases, parathyroidectomy has an attendant risk of postoperative hypocalcemia which can be mitigated with calcium and vitamin D supplementation. Other possible complications associated with this may include, but may not be restricted to hoarseness, recurrent laryngeal nerve injury - temporary or permanent, superior laryngeal nerve injury - temporary or permanent, hypocalcemia and hypoparathyroidism - temporary or permanent, neck hematoma, bleeding, infection, scarring, wound healing problems and possible death. We discussed that in rare cases, parathyroid exploration may be negative. Recurrent and persistent disease are also possible.      All questions were answered and the patient expressed understanding of all the risks, benefits and alternatives, and agreed to proceed with the plan despite the risks.     Problem List Items Addressed This Visit       Primary hyperparathyroidism - Primary     Asymptomatic primary hyperparathyroidism and meets criteria for parathyroid surgery based on young age and notably has a family history of hyperparathyroidism and nephrolithiasis.  Localization studies have not yet been obtained.    - I have recommended proceeding with OR for parathyroidectomy with intraoperative PTH monitoring, patient is considering her options.   - No further biochemical workup needed.   - Will plan to obtain a 4D parathyroid C scan and thyroid/ neck ultrasound prior to the procedure if she chooses to proceed at [facility] and recommendations have been extensively discussed. Patient to consider her options and will contact us with decision.          Relevant Orders    US Soft Tissue Head Neck    Vitamin D insufficiency     - Continue 2000 IU over the counter vitamin D3 daily           Patient  was seen and evaluated with surgery resident Mahsa Gibson MD.    Briana Kamara MD  Staff Surgeon  Endocrine Surgery  3/18/24

## 2024-03-18 ENCOUNTER — OFFICE VISIT (OUTPATIENT)
Dept: SURGERY | Facility: CLINIC | Age: 39
End: 2024-03-18
Payer: COMMERCIAL

## 2024-03-18 VITALS
WEIGHT: 107.06 LBS | BODY MASS INDEX: 19.7 KG/M2 | SYSTOLIC BLOOD PRESSURE: 90 MMHG | DIASTOLIC BLOOD PRESSURE: 70 MMHG | HEIGHT: 62 IN

## 2024-03-18 DIAGNOSIS — E21.0 PRIMARY HYPERPARATHYROIDISM: Primary | ICD-10-CM

## 2024-03-18 DIAGNOSIS — E55.9 VITAMIN D INSUFFICIENCY: ICD-10-CM

## 2024-03-18 PROCEDURE — 99205 OFFICE O/P NEW HI 60 MIN: CPT | Mod: S$GLB,,, | Performed by: STUDENT IN AN ORGANIZED HEALTH CARE EDUCATION/TRAINING PROGRAM

## 2024-03-18 PROCEDURE — 3078F DIAST BP <80 MM HG: CPT | Mod: CPTII,S$GLB,, | Performed by: STUDENT IN AN ORGANIZED HEALTH CARE EDUCATION/TRAINING PROGRAM

## 2024-03-18 PROCEDURE — 3074F SYST BP LT 130 MM HG: CPT | Mod: CPTII,S$GLB,, | Performed by: STUDENT IN AN ORGANIZED HEALTH CARE EDUCATION/TRAINING PROGRAM

## 2024-03-18 PROCEDURE — 3008F BODY MASS INDEX DOCD: CPT | Mod: CPTII,S$GLB,, | Performed by: STUDENT IN AN ORGANIZED HEALTH CARE EDUCATION/TRAINING PROGRAM

## 2024-03-18 PROCEDURE — 1159F MED LIST DOCD IN RCRD: CPT | Mod: CPTII,S$GLB,, | Performed by: STUDENT IN AN ORGANIZED HEALTH CARE EDUCATION/TRAINING PROGRAM

## 2024-03-18 PROCEDURE — 99999 PR PBB SHADOW E&M-EST. PATIENT-LVL III: CPT | Mod: PBBFAC,,, | Performed by: STUDENT IN AN ORGANIZED HEALTH CARE EDUCATION/TRAINING PROGRAM

## 2024-03-18 NOTE — ASSESSMENT & PLAN NOTE
Asymptomatic primary hyperparathyroidism and meets criteria for parathyroid surgery based on young age and notably has a family history of hyperparathyroidism and nephrolithiasis.  Localization studies have not yet been obtained.    - I have recommended proceeding with OR for parathyroidectomy with intraoperative PTH monitoring, patient is considering her options.   - No further biochemical workup needed.   - Will plan to obtain a 4D parathyroid C scan and thyroid/ neck ultrasound prior to the procedure if she chooses to proceed at [facility] and recommendations have been extensively discussed. Patient to consider her options and will contact us with decision.

## 2024-05-09 ENCOUNTER — PATIENT MESSAGE (OUTPATIENT)
Dept: ENDOCRINOLOGY | Facility: CLINIC | Age: 39
End: 2024-05-09
Payer: COMMERCIAL

## 2024-05-09 DIAGNOSIS — E21.0 PRIMARY HYPERPARATHYROIDISM: Primary | ICD-10-CM

## 2024-05-15 ENCOUNTER — HOSPITAL ENCOUNTER (OUTPATIENT)
Dept: RADIOLOGY | Facility: HOSPITAL | Age: 39
Discharge: HOME | End: 2024-05-15
Attending: INTERNAL MEDICINE
Payer: COMMERCIAL

## 2024-05-15 DIAGNOSIS — E21.0 PRIMARY HYPERPARATHYROIDISM: ICD-10-CM

## 2024-05-15 PROCEDURE — 77080 DXA BONE DENSITY AXIAL: CPT | Mod: TC

## 2024-05-15 PROCEDURE — 77080 DXA BONE DENSITY AXIAL: CPT | Mod: 26,,, | Performed by: INTERNAL MEDICINE

## 2024-05-15 PROCEDURE — 77081 DXA BONE DENSITY APPENDICULR: CPT | Mod: TC

## 2024-06-06 ENCOUNTER — OFFICE VISIT (OUTPATIENT)
Dept: CARDIOLOGY | Facility: CLINIC | Age: 39
End: 2024-06-06
Payer: COMMERCIAL

## 2024-06-06 VITALS
OXYGEN SATURATION: 99 % | WEIGHT: 108 LBS | BODY MASS INDEX: 19.88 KG/M2 | DIASTOLIC BLOOD PRESSURE: 70 MMHG | SYSTOLIC BLOOD PRESSURE: 110 MMHG | HEART RATE: 78 BPM | HEIGHT: 62 IN

## 2024-06-06 DIAGNOSIS — Z01.810 PREOPERATIVE CARDIOVASCULAR EXAMINATION: Primary | ICD-10-CM

## 2024-06-06 PROCEDURE — 3008F BODY MASS INDEX DOCD: CPT | Mod: CPTII,S$GLB,, | Performed by: INTERNAL MEDICINE

## 2024-06-06 PROCEDURE — 1160F RVW MEDS BY RX/DR IN RCRD: CPT | Mod: CPTII,S$GLB,, | Performed by: INTERNAL MEDICINE

## 2024-06-06 PROCEDURE — 1159F MED LIST DOCD IN RCRD: CPT | Mod: CPTII,S$GLB,, | Performed by: INTERNAL MEDICINE

## 2024-06-06 PROCEDURE — 3074F SYST BP LT 130 MM HG: CPT | Mod: CPTII,S$GLB,, | Performed by: INTERNAL MEDICINE

## 2024-06-06 PROCEDURE — 3078F DIAST BP <80 MM HG: CPT | Mod: CPTII,S$GLB,, | Performed by: INTERNAL MEDICINE

## 2024-06-06 PROCEDURE — 99999 PR PBB SHADOW E&M-EST. PATIENT-LVL IV: CPT | Mod: PBBFAC,,, | Performed by: INTERNAL MEDICINE

## 2024-06-06 PROCEDURE — 99203 OFFICE O/P NEW LOW 30 MIN: CPT | Mod: S$GLB,,, | Performed by: INTERNAL MEDICINE

## 2024-06-06 NOTE — PROGRESS NOTES
Subjective:   Patient ID:  Opal Marx is a 39 y.o. female who presents for evaluation of Palpitations      HPI: Very pleasant young woman here for evaluation prior to planned endocrine (parathyroid) surgery.  She's concerned that her heart rhythm is irregular with rising and falling of the rate as well as what seem like extra beats with pauses that follow.    She has two pre-teens at home and doesn't exercise formally but keeps up with them well.  She otherwise feels well and denies chest discomfort, GEORGE, PND/orthopnea, lightheadedness and syncope.  No leg swelling.  No family history of premature or sudden cardiovascular death.          History reviewed. No pertinent past medical history.    Past Surgical History:   Procedure Laterality Date    DILATION AND CURETTAGE OF UTERUS  2014       Social History     Tobacco Use    Smoking status: Never     Passive exposure: Never    Smokeless tobacco: Never   Substance Use Topics    Alcohol use: Yes    Drug use: Never       Family History   Problem Relation Name Age of Onset    Hyperparathyroidism Mother      Nephrolithiasis Mother      Heart attack Father  48    Prostate cancer Maternal Grandfather          started as prostate mets to colon    Colon cancer Maternal Grandfather      Crohn's disease Cousin      Breast cancer Neg Hx      Ovarian cancer Neg Hx      Uterine cancer Neg Hx         Current Outpatient Medications   Medication Sig    propranoloL (INDERAL) 10 MG tablet Take 1 tablet (10 mg total) by mouth 3 (three) times daily as needed (anxiety).     No current facility-administered medications for this visit.       Review of patient's allergies indicates:   Allergen Reactions    Meperidine Rash       ROS  The review of systems is negative except as above.    Objective:   Physical Exam  Vitals reviewed.   Constitutional:       Appearance: She is well-developed.   HENT:      Head: Normocephalic and atraumatic.   Eyes:      General: No scleral icterus.      Conjunctiva/sclera: Conjunctivae normal.   Neck:      Vascular: No JVD.   Cardiovascular:      Rate and Rhythm: Normal rate and regular rhythm.      Pulses: Intact distal pulses.      Heart sounds: Normal heart sounds. No murmur heard.     No friction rub. No gallop.   Pulmonary:      Effort: Pulmonary effort is normal.      Breath sounds: Normal breath sounds. No wheezing or rales.   Abdominal:      General: Bowel sounds are normal. There is no distension.      Palpations: Abdomen is soft.      Tenderness: There is no abdominal tenderness.   Musculoskeletal:         General: Normal range of motion.      Cervical back: Normal range of motion and neck supple.   Skin:     General: Skin is warm and dry.      Findings: No erythema or rash.   Neurological:      Mental Status: She is alert and oriented to person, place, and time.   Psychiatric:         Behavior: Behavior normal.         Thought Content: Thought content normal.         Judgment: Judgment normal.     Lab Results   Component Value Date    WBC 5.24 03/16/2023    HGB 14.4 03/16/2023    HCT 43.3 03/16/2023    MCV 92 03/16/2023     03/16/2023         Chemistry        Component Value Date/Time     08/24/2023 1110    K 4.2 08/24/2023 1110     08/24/2023 1110    CO2 27 08/24/2023 1110    BUN 9 08/24/2023 1110    CREATININE 0.7 08/24/2023 1110    GLU 77 08/24/2023 1110        Component Value Date/Time    CALCIUM 10.8 (H) 08/24/2023 1110    ALKPHOS 65 08/24/2023 1110    AST 22 08/24/2023 1110    ALT 14 08/24/2023 1110    BILITOT 0.6 08/24/2023 1110            Lab Results   Component Value Date    CHOL 230 (H) 03/16/2023     Lab Results   Component Value Date    HDL 85 (H) 03/16/2023     Lab Results   Component Value Date    LDLCALC 132.4 03/16/2023     Lab Results   Component Value Date    TRIG 63 03/16/2023     Lab Results   Component Value Date    CHOLHDL 37.0 03/16/2023       Lab Results   Component Value Date    TSH 2.347 03/16/2023       Lab  Results   Component Value Date    HGBA1C 4.3 08/24/2023         Assessment:     1. Preoperative cardiovascular examination        Plan:     Reassurance.    She has no angina, heart failure, or unstable arrhythmia.  No further cardiovascular testing is required prior to proceeding to the operating room.    F/U PRN

## 2024-07-25 ENCOUNTER — PATIENT MESSAGE (OUTPATIENT)
Dept: ENDOCRINOLOGY | Facility: CLINIC | Age: 39
End: 2024-07-25
Payer: COMMERCIAL

## 2024-07-25 DIAGNOSIS — E21.0 PRIMARY HYPERPARATHYROIDISM: Primary | ICD-10-CM

## 2024-07-27 LAB
25(OH)D3+25(OH)D2 SERPL-MCNC: 39 NG/ML (ref 30–100)
ALBUMIN SERPL-MCNC: 4.8 G/DL (ref 3.6–5.1)
ALBUMIN/GLOB SERPL: 2.1 (CALC) (ref 1–2.5)
ALP SERPL-CCNC: 57 U/L (ref 31–125)
ALT SERPL-CCNC: 13 U/L (ref 6–29)
AST SERPL-CCNC: 23 U/L (ref 10–30)
BILIRUB SERPL-MCNC: 0.7 MG/DL (ref 0.2–1.2)
BUN SERPL-MCNC: 10 MG/DL (ref 7–25)
BUN/CREAT SERPL: NORMAL (CALC) (ref 6–22)
CALCIUM SERPL-MCNC: 9.9 MG/DL (ref 8.6–10.2)
CHLORIDE SERPL-SCNC: 102 MMOL/L (ref 98–110)
CO2 SERPL-SCNC: 28 MMOL/L (ref 20–32)
CREAT SERPL-MCNC: 0.66 MG/DL (ref 0.5–0.97)
EGFR: 115 ML/MIN/1.73M2
GLOBULIN SER CALC-MCNC: 2.3 G/DL (CALC) (ref 1.9–3.7)
GLUCOSE SERPL-MCNC: 84 MG/DL (ref 65–99)
PHOSPHATE SERPL-MCNC: 3.6 MG/DL (ref 2.5–4.5)
POTASSIUM SERPL-SCNC: 4.5 MMOL/L (ref 3.5–5.3)
PROT SERPL-MCNC: 7.1 G/DL (ref 6.1–8.1)
PTH-INTACT SERPL-MCNC: 36 PG/ML (ref 16–77)
SODIUM SERPL-SCNC: 139 MMOL/L (ref 135–146)

## 2024-07-31 ENCOUNTER — OFFICE VISIT (OUTPATIENT)
Dept: ENDOCRINOLOGY | Facility: CLINIC | Age: 39
End: 2024-07-31
Payer: COMMERCIAL

## 2024-07-31 DIAGNOSIS — E55.9 VITAMIN D INSUFFICIENCY: ICD-10-CM

## 2024-07-31 DIAGNOSIS — E21.0 PRIMARY HYPERPARATHYROIDISM: Primary | ICD-10-CM

## 2024-07-31 PROCEDURE — 99214 OFFICE O/P EST MOD 30 MIN: CPT | Mod: 95,,, | Performed by: INTERNAL MEDICINE

## 2024-07-31 PROCEDURE — G2211 COMPLEX E/M VISIT ADD ON: HCPCS | Mod: 95,,, | Performed by: INTERNAL MEDICINE

## 2024-07-31 PROCEDURE — 1160F RVW MEDS BY RX/DR IN RCRD: CPT | Mod: CPTII,95,, | Performed by: INTERNAL MEDICINE

## 2024-07-31 PROCEDURE — 1159F MED LIST DOCD IN RCRD: CPT | Mod: CPTII,95,, | Performed by: INTERNAL MEDICINE

## 2024-07-31 NOTE — ASSESSMENT & PLAN NOTE
Status post surgery.  Labs show normalization.  Again reviewed the rationale for genetic testing.  She will  consider a let me know if she would like to proceed.

## 2024-07-31 NOTE — PROGRESS NOTES
Subjective:      Patient ID: Opal Marx is a 39 y.o.    Chief Complaint:  PTH mediated hypercalcemia    History of Present Illness  With regards to the hypercalcemia      Was found to have hypercalcemia on routine labs - first noted:   Pth was checked:  And elevated       She opted to go to the [facility] and she is status post parathyroidectomy - June 13 2024     I do not see the results but her surgeon send the parathyroid adenoma was large for her age.  She did well with the surgery.  She is still having some discomfort in her neck.     Denies constipation, depression, polyuria     Last bmd was:    05/15/2024 normal   Denies fractures, height loss or kidney stones      She does note that her mother had primary hyperparathyroidism.  Had kidney stones.  Is status post parathyroidectomy.        We did order genetic testing but it was not completed    ROS:   As above    Objective:     There were no vitals taken for this visit.    There is no height or weight on file to calculate BMI.      Physical Exam  Constitutional:       Appearance: Normal appearance.   Psychiatric:         Attention and Perception: Attention normal.         Mood and Affect: Mood normal.         Speech: Speech normal.         Behavior: Behavior normal.         Thought Content: Thought content normal.         Judgment: Judgment normal.                Lab Review:   Lab Results   Component Value Date    HGBA1C 4.3 08/24/2023     Lab Results   Component Value Date    CHOL 230 (H) 03/16/2023    HDL 85 (H) 03/16/2023    LDLCALC 132.4 03/16/2023    TRIG 63 03/16/2023    CHOLHDL 37.0 03/16/2023     Lab Results   Component Value Date     07/26/2024    K 4.5 07/26/2024     07/26/2024    CO2 28 07/26/2024    GLU 84 07/26/2024    BUN 10 07/26/2024    CREATININE 0.66 07/26/2024    CALCIUM 9.9 07/26/2024    PROT 7.1 07/26/2024    ALBUMIN 4.8 07/26/2024    BILITOT 0.7 07/26/2024    ALKPHOS 65 08/24/2023    AST 23 07/26/2024     ALT 13 07/26/2024    ANIONGAP 10 08/24/2023    TSH 2.347 03/16/2023     Vit D, 25-Hydroxy   Date Value Ref Range Status   01/17/2024 21 (L) 30 - 96 ng/mL Final     Comment:     Vitamin D deficiency.........<10 ng/mL                              Vitamin D insufficiency......10-29 ng/mL       Vitamin D sufficiency........> or equal to 30 ng/mL  Vitamin D toxicity............>100 ng/mL       Lab Results   Component Value Date    WBC 5.24 03/16/2023    HGB 14.4 03/16/2023    HCT 43.3 03/16/2023    MCV 92 03/16/2023     03/16/2023           Assessment and Plan     Primary hyperparathyroidism    Status post surgery.  Labs show normalization.  Again reviewed the rationale for genetic testing.  She will  consider a let me know if she would like to proceed.    Vitamin D insufficiency  - Continue 2000 IU over the counter vitamin D3 daily        The patient location is: [place]  The chief complaint leading to consultation is: above      Level of service is based on medical decision-making and not time      Each patient to whom he or she provides medical services by telemedicine is:  (1) informed of the relationship between the physician and patient and the respective role of any other health care provider with respect to management of the patient; and (2) notified that he or she may decline to receive medical services by telemedicine and may withdraw from such care at any time.

## 2024-07-31 NOTE — ASSESSMENT & PLAN NOTE
- Continue 2000 IU over the counter vitamin D3 daily   Kamala is a 25 year old who is being evaluated via a billable video visit.      How would you like to obtain your AVS? MyChart  If the video visit is dropped, the invitation should be resent by: Text to cell phone: [phone]  Will anyone else be joining your video visit? No    ================================================================      Assessment & Plan     Sleep apnea, unspecified type  - Symptoms are consistent with sleep apnea  - Will need to get a sleep study to determine severity and treatment   - Adult Sleep Eval & Management  Referral; Future    Excessive thirst  - Previously normal A1c.  Will recheck and look for other causes as well  - Consider FRANKLYN, anti-ro/la if no cause is found   - Hemoglobin A1c; Future  - Comprehensive metabolic panel (BMP + Alb, Alk Phos, ALT, AST, Total. Bili, TP); Future  - TSH with free T4 reflex; Future  - CBC with platelets and differential; Future  - ESR: Erythrocyte sedimentation rate; Future  - UA with Microscopic - lab collect; Future    Radha Ashby [facility]    ===========================================================    Subjective   Kamala is a 25 year old who presents for the following health issues     History of Present Illness       Reason for visit:  Sleep study  Symptom onset:  More than a month  Symptoms include:  Snoring and low heart rate while asleep  Symptom intensity:  Moderate  Symptom progression:  Worsening  Had these symptoms before:  Yes  Has tried/received treatment for these symptoms:  No  What makes it worse:  No  What makes it better:  No    She eats 2-3 servings of fruits and vegetables daily.She consumes 3 sweetened beverage(s) daily.She exercises with enough effort to increase her heart rate 10 to 19 minutes per day.  She exercises with enough effort to increase her heart rate 3 or less days per week. She is missing 2 dose(s) of medications per week.  She is not taking prescribed  medications regularly due to remembering to take.     Her smart watch recently notified her that her HR was lower than 50 for more than 10 minutes while she was sleeping.  This has happened a few times.  She has also noticed worsening snoring.  Denies symptoms of gasping.  Family members have noticed apnea.    Patient also notes that she continues to feel thirsty all the time.  This has been an ongoing problem.  Previously had elevated fasting glucose, but normal A1C.  She feels thirsty even after drinking multiple liters of water a day.       Review of Systems   Constitutional, HEENT, cardiovascular, pulmonary, gi and gu systems are negative, except as otherwise noted.      Objective       Vitals:  No vitals were obtained today due to virtual visit.    Physical Exam   GENERAL: Healthy, alert and no distress  EYES: Eyes grossly normal to inspection.  No discharge or erythema, or obvious scleral/conjunctival abnormalities.  RESP: No audible wheeze, cough, or visible cyanosis.  No visible retractions or increased work of breathing.    SKIN: Visible skin clear. No significant rash, abnormal pigmentation or lesions.  NEURO: Cranial nerves grossly intact.  Mentation and speech appropriate for age.  PSYCH: Mentation appears normal, affect normal/bright, judgement and insight intact, normal speech and appearance well-groomed.        Video-Visit Details    Type of service:  Video Visit    Video Start Time: 3:17 PM   Video End Time: 3:24 PM     Originating Location (pt. Location): Home    Distant Location (provider location):  [facility]     Platform used for Video Visit: Tasqe

## 2024-07-31 NOTE — PATIENT INSTRUCTIONS
Thank you for completing a virtual visit with me!     Per our conversation,  your repeat labs are normal.  I would still encourage you to consider genetic testing based on your young age as well as your mother having primary hyperparathyroidism.  See link below regarding information.  If you are interested, send me a note in the patient portal and I will order the blood test.     Genetic condition  Benefits of genetic testing  [facility]      [facility] Hereditary Hyperparathyroidism Panel  Test catalog  [facility]     Thank you,  Lola Bryant MD

## 2024-09-13 ENCOUNTER — TELEPHONE (OUTPATIENT)
Dept: ENDOCRINOLOGY | Facility: CLINIC | Age: 39
End: 2024-09-13
Payer: COMMERCIAL

## 2024-09-13 NOTE — TELEPHONE ENCOUNTER
----- Message from Daisha Hong sent at 9/13/2024  9:23 AM CDT -----  Contact: pt @ [phone]  Opal Marx calling regarding Patient Advice (message) for #pt is calling to speak with nurse concerning appt 7/31, asking for call back

## 2024-10-17 ENCOUNTER — OFFICE VISIT (OUTPATIENT)
Dept: PRIMARY CARE CLINIC | Facility: CLINIC | Age: 39
End: 2024-10-17
Payer: COMMERCIAL

## 2024-10-17 ENCOUNTER — PATIENT MESSAGE (OUTPATIENT)
Dept: PRIMARY CARE CLINIC | Facility: CLINIC | Age: 39
End: 2024-10-17

## 2024-10-17 VITALS
WEIGHT: 114 LBS | DIASTOLIC BLOOD PRESSURE: 68 MMHG | SYSTOLIC BLOOD PRESSURE: 102 MMHG | BODY MASS INDEX: 20.85 KG/M2 | OXYGEN SATURATION: 97 % | HEART RATE: 75 BPM

## 2024-10-17 DIAGNOSIS — Z13.1 SCREENING FOR DIABETES MELLITUS: ICD-10-CM

## 2024-10-17 DIAGNOSIS — Z98.890 H/O PARATHYROIDECTOMY: ICD-10-CM

## 2024-10-17 DIAGNOSIS — Z90.89 H/O PARATHYROIDECTOMY: ICD-10-CM

## 2024-10-17 DIAGNOSIS — E55.9 VITAMIN D DEFICIENCY: ICD-10-CM

## 2024-10-17 DIAGNOSIS — Z00.00 ANNUAL PHYSICAL EXAM: Primary | ICD-10-CM

## 2024-10-17 PROCEDURE — 3008F BODY MASS INDEX DOCD: CPT | Mod: CPTII,S$GLB,, | Performed by: STUDENT IN AN ORGANIZED HEALTH CARE EDUCATION/TRAINING PROGRAM

## 2024-10-17 PROCEDURE — 3074F SYST BP LT 130 MM HG: CPT | Mod: CPTII,S$GLB,, | Performed by: STUDENT IN AN ORGANIZED HEALTH CARE EDUCATION/TRAINING PROGRAM

## 2024-10-17 PROCEDURE — 99395 PREV VISIT EST AGE 18-39: CPT | Mod: S$GLB,,, | Performed by: STUDENT IN AN ORGANIZED HEALTH CARE EDUCATION/TRAINING PROGRAM

## 2024-10-17 PROCEDURE — 1160F RVW MEDS BY RX/DR IN RCRD: CPT | Mod: CPTII,S$GLB,, | Performed by: STUDENT IN AN ORGANIZED HEALTH CARE EDUCATION/TRAINING PROGRAM

## 2024-10-17 PROCEDURE — 1159F MED LIST DOCD IN RCRD: CPT | Mod: CPTII,S$GLB,, | Performed by: STUDENT IN AN ORGANIZED HEALTH CARE EDUCATION/TRAINING PROGRAM

## 2024-10-17 PROCEDURE — 99999 PR PBB SHADOW E&M-EST. PATIENT-LVL III: CPT | Mod: PBBFAC,,, | Performed by: STUDENT IN AN ORGANIZED HEALTH CARE EDUCATION/TRAINING PROGRAM

## 2024-10-17 PROCEDURE — 3078F DIAST BP <80 MM HG: CPT | Mod: CPTII,S$GLB,, | Performed by: STUDENT IN AN ORGANIZED HEALTH CARE EDUCATION/TRAINING PROGRAM

## 2024-10-17 NOTE — PROGRESS NOTES
SUBJECTIVE     Chief Complaint   Patient presents with    Annual Exam       HPI  Opal Marx is a 39 y.o. female with medical diagnoses as listed in the medical history and problem list that presents for annual exam. Pt has been doing well since our last appointment.    Pt is UTD on age appropriate CA screening.    Family, social, surgical Hx reviewed     ESTHELA: due for repeat level    Situational anxiety:Propranolol prn. Does not experience daily sxs.    Serum Ca elevated on labwork last year  S/p parathyroidectomy, following regularly with endocrinology    Health Maintenance         Date Due Completion Date    Hepatitis C Screening Never done ---    HIV Screening Never done ---    TETANUS VACCINE 08/09/2014 8/9/2004    Influenza Vaccine (1) Never done ---    COVID-19 Vaccine (2 - 2024-25 season) 09/01/2024 8/14/2021    DEXA Scan 05/15/2026 5/15/2024    Cervical Cancer Screening 03/15/2028 3/15/2023    RSV Vaccine (Age 60+ and Pregnant patients) (1 - 1-dose 75+ series) 04/18/2061 ---            PAST MEDICAL HISTORY:  History reviewed. No pertinent past medical history.    PAST SURGICAL HISTORY:  Past Surgical History:   Procedure Laterality Date    DILATION AND CURETTAGE OF UTERUS  2014    PARATHYROIDECTOMY  06/13/2024       SOCIAL HISTORY:  Social History     Socioeconomic History    Marital status:    Occupational History    Occupation:    Tobacco Use    Smoking status: Never     Passive exposure: Never    Smokeless tobacco: Never   Substance and Sexual Activity    Alcohol use: Yes     Alcohol/week: 6.0 standard drinks of alcohol     Types: 3 Glasses of wine, 3 Cans of beer per week    Drug use: Never    Sexual activity: Yes     Partners: Male     Birth control/protection: Condom     Social Drivers of Health     Financial Resource Strain: Low Risk  (2/7/2024)    Overall Financial Resource Strain (CARDIA)     Difficulty of Paying Living Expenses: Not very hard   Food Insecurity: No  Food Insecurity (2/7/2024)    Hunger Vital Sign     Worried About Running Out of Food in the Last Year: Never true     Ran Out of Food in the Last Year: Never true   Transportation Needs: No Transportation Needs (2/7/2024)    PRAPARE - Transportation     Lack of Transportation (Medical): No     Lack of Transportation (Non-Medical): No   Physical Activity: Insufficiently Active (2/7/2024)    Exercise Vital Sign     Days of Exercise per Week: 2 days     Minutes of Exercise per Session: 20 min   Stress: Stress Concern Present (2/7/2024)    [employer] - Occupational Stress Questionnaire     Feeling of Stress : To some extent   Housing Stability: Low Risk  (2/7/2024)    Housing Stability Vital Sign     Unable to Pay for Housing in the Last Year: No     Number of Places Lived in the Last Year: 1     Unstable Housing in the Last Year: No       FAMILY HISTORY:  Family History   Problem Relation Name Age of Onset    Hyperparathyroidism Mother      Nephrolithiasis Mother      Heart attack Father  48    Prostate cancer Maternal Grandfather          started as prostate mets to colon    Colon cancer Maternal Grandfather      Crohn's disease Cousin      Breast cancer Neg Hx      Ovarian cancer Neg Hx      Uterine cancer Neg Hx         ALLERGIES AND MEDICATIONS: updated and reviewed.  Review of patient's allergies indicates:   Allergen Reactions    Meperidine Rash     Current Outpatient Medications   Medication Sig Dispense Refill    propranoloL (INDERAL) 10 MG tablet Take 1 tablet (10 mg total) by mouth 3 (three) times daily as needed (anxiety). (Patient not taking: Reported on 6/6/2024) 90 tablet 11     No current facility-administered medications for this visit.       ROS  Review of Systems   Constitutional:  Negative for fever and weight loss.   HENT:  Negative for hearing loss.    Eyes:  Negative for discharge.   Respiratory:  Negative for cough, shortness of breath and wheezing.     Cardiovascular:  Negative for chest pain and palpitations.   Gastrointestinal:  Negative for abdominal pain, blood in stool, constipation, diarrhea, nausea and vomiting.   Genitourinary:  Negative for dysuria and hematuria.   Musculoskeletal:  Negative for back pain, joint pain and neck pain.   Skin:  Negative for rash.   Neurological:  Negative for dizziness, weakness and headaches.   Endo/Heme/Allergies:  Negative for polydipsia.   Psychiatric/Behavioral:  Negative for depression. The patient is not nervous/anxious.          OBJECTIVE     Physical Exam  Vitals:    10/17/24 1308   BP: 102/68   Pulse: 75    Body mass index is 20.85 kg/m².  Weight: 51.7 kg (113 lb 15.7 oz)         Physical Exam  HENT:      Head: Normocephalic and atraumatic.      Nose: Nose normal.      Mouth/Throat:      Mouth: Mucous membranes are moist.      Pharynx: Oropharynx is clear.   Eyes:      Extraocular Movements: Extraocular movements intact.      Conjunctiva/sclera: Conjunctivae normal.      Pupils: Pupils are equal, round, and reactive to light.   Cardiovascular:      Rate and Rhythm: Normal rate and regular rhythm.   Pulmonary:      Effort: Pulmonary effort is normal.      Breath sounds: Normal breath sounds.   Musculoskeletal:         General: No swelling. Normal range of motion.      Cervical back: Normal range of motion.      Right lower leg: No edema.      Left lower leg: No edema.   Skin:     General: Skin is warm.      Findings: No lesion or rash.   Neurological:      General: No focal deficit present.      Mental Status: She is alert and oriented to person, place, and time.      Motor: No weakness.   Psychiatric:         Mood and Affect: Mood normal.         Thought Content: Thought content normal.               ASSESSMENT     39 y.o. female with     1. Annual physical exam    2. Screening for diabetes mellitus    3. Vitamin D deficiency    4. H/O parathyroidectomy          PLAN:     1. Annual physical exam  -     TSH;  Future; Expected date: 10/17/2024  -     Lipid Panel; Future; Expected date: 10/17/2024  -     Comprehensive Metabolic Panel; Future; Expected date: 10/17/2024  -     CBC Auto Differential; Future; Expected date: 10/17/2024    2. Screening for diabetes mellitus  -     Hemoglobin A1C; Future; Expected date: 10/17/2024    3. Vitamin D deficiency  Follow up vit D level    4. H/O parathyroidectomy  -     Vitamin D; Future; Expected date: 10/17/2024  Continue management per endocrinology        Discussed age and gender appropriate screenings at this visit and encouraged a healthy diet low in simple carbohydrates, and increased physical activity.  Counseled on medically appropriate vaccines based on age and current health condition.  Screening test reviewed and discussed with patient.      RTC in 1 year     Sakshi Kirkland MD

## 2024-10-25 ENCOUNTER — LAB VISIT (OUTPATIENT)
Dept: LAB | Facility: HOSPITAL | Age: 39
End: 2024-10-25
Attending: STUDENT IN AN ORGANIZED HEALTH CARE EDUCATION/TRAINING PROGRAM
Payer: COMMERCIAL

## 2024-10-25 DIAGNOSIS — Z90.89 H/O PARATHYROIDECTOMY: ICD-10-CM

## 2024-10-25 DIAGNOSIS — Z13.1 SCREENING FOR DIABETES MELLITUS: ICD-10-CM

## 2024-10-25 DIAGNOSIS — Z00.00 ANNUAL PHYSICAL EXAM: ICD-10-CM

## 2024-10-25 DIAGNOSIS — Z98.890 H/O PARATHYROIDECTOMY: ICD-10-CM

## 2024-10-25 LAB
25(OH)D3+25(OH)D2 SERPL-MCNC: 32 NG/ML (ref 30–96)
ALBUMIN SERPL BCP-MCNC: 4.2 G/DL (ref 3.5–5.2)
ALP SERPL-CCNC: 54 U/L (ref 40–150)
ALT SERPL W/O P-5'-P-CCNC: 14 U/L (ref 10–44)
ANION GAP SERPL CALC-SCNC: 8 MMOL/L (ref 8–16)
AST SERPL-CCNC: 25 U/L (ref 10–40)
BASOPHILS # BLD AUTO: 0.03 K/UL (ref 0–0.2)
BASOPHILS NFR BLD: 0.6 % (ref 0–1.9)
BILIRUB SERPL-MCNC: 0.7 MG/DL (ref 0.1–1)
BUN SERPL-MCNC: 10 MG/DL (ref 6–20)
CALCIUM SERPL-MCNC: 9.9 MG/DL (ref 8.7–10.5)
CHLORIDE SERPL-SCNC: 106 MMOL/L (ref 95–110)
CHOLEST SERPL-MCNC: 233 MG/DL (ref 120–199)
CHOLEST/HDLC SERPL: 2.6 {RATIO} (ref 2–5)
CO2 SERPL-SCNC: 27 MMOL/L (ref 23–29)
CREAT SERPL-MCNC: 0.7 MG/DL (ref 0.5–1.4)
DIFFERENTIAL METHOD BLD: ABNORMAL
EOSINOPHIL # BLD AUTO: 0.1 K/UL (ref 0–0.5)
EOSINOPHIL NFR BLD: 1.8 % (ref 0–8)
ERYTHROCYTE [DISTWIDTH] IN BLOOD BY AUTOMATED COUNT: 12.3 % (ref 11.5–14.5)
EST. GFR  (NO RACE VARIABLE): >60 ML/MIN/1.73 M^2
ESTIMATED AVG GLUCOSE: 80 MG/DL (ref 68–131)
GLUCOSE SERPL-MCNC: 80 MG/DL (ref 70–110)
HBA1C MFR BLD: 4.4 % (ref 4–5.6)
HCT VFR BLD AUTO: 39.5 % (ref 37–48.5)
HDLC SERPL-MCNC: 90 MG/DL (ref 40–75)
HDLC SERPL: 38.6 % (ref 20–50)
HGB BLD-MCNC: 13.8 G/DL (ref 12–16)
IMM GRANULOCYTES # BLD AUTO: 0.01 K/UL (ref 0–0.04)
IMM GRANULOCYTES NFR BLD AUTO: 0.2 % (ref 0–0.5)
LDLC SERPL CALC-MCNC: 129.6 MG/DL (ref 63–159)
LYMPHOCYTES # BLD AUTO: 2 K/UL (ref 1–4.8)
LYMPHOCYTES NFR BLD: 40.3 % (ref 18–48)
MCH RBC QN AUTO: 31.7 PG (ref 27–31)
MCHC RBC AUTO-ENTMCNC: 34.9 G/DL (ref 32–36)
MCV RBC AUTO: 91 FL (ref 82–98)
MONOCYTES # BLD AUTO: 0.4 K/UL (ref 0.3–1)
MONOCYTES NFR BLD: 8.9 % (ref 4–15)
NEUTROPHILS # BLD AUTO: 2.4 K/UL (ref 1.8–7.7)
NEUTROPHILS NFR BLD: 48.2 % (ref 38–73)
NONHDLC SERPL-MCNC: 143 MG/DL
NRBC BLD-RTO: 0 /100 WBC
PLATELET # BLD AUTO: 249 K/UL (ref 150–450)
PMV BLD AUTO: 11 FL (ref 9.2–12.9)
POTASSIUM SERPL-SCNC: 4.8 MMOL/L (ref 3.5–5.1)
PROT SERPL-MCNC: 7.2 G/DL (ref 6–8.4)
RBC # BLD AUTO: 4.36 M/UL (ref 4–5.4)
SODIUM SERPL-SCNC: 141 MMOL/L (ref 136–145)
TRIGL SERPL-MCNC: 67 MG/DL (ref 30–150)
TSH SERPL DL<=0.005 MIU/L-ACNC: 2.36 UIU/ML (ref 0.4–4)
WBC # BLD AUTO: 4.94 K/UL (ref 3.9–12.7)

## 2024-10-25 PROCEDURE — 85025 COMPLETE CBC W/AUTO DIFF WBC: CPT | Performed by: STUDENT IN AN ORGANIZED HEALTH CARE EDUCATION/TRAINING PROGRAM

## 2024-10-25 PROCEDURE — 80053 COMPREHEN METABOLIC PANEL: CPT | Performed by: STUDENT IN AN ORGANIZED HEALTH CARE EDUCATION/TRAINING PROGRAM

## 2024-10-25 PROCEDURE — 82306 VITAMIN D 25 HYDROXY: CPT | Performed by: STUDENT IN AN ORGANIZED HEALTH CARE EDUCATION/TRAINING PROGRAM

## 2024-10-25 PROCEDURE — 80061 LIPID PANEL: CPT | Performed by: STUDENT IN AN ORGANIZED HEALTH CARE EDUCATION/TRAINING PROGRAM

## 2024-10-25 PROCEDURE — 84443 ASSAY THYROID STIM HORMONE: CPT | Performed by: STUDENT IN AN ORGANIZED HEALTH CARE EDUCATION/TRAINING PROGRAM

## 2024-10-25 PROCEDURE — 36415 COLL VENOUS BLD VENIPUNCTURE: CPT | Performed by: STUDENT IN AN ORGANIZED HEALTH CARE EDUCATION/TRAINING PROGRAM

## 2024-10-25 PROCEDURE — 83036 HEMOGLOBIN GLYCOSYLATED A1C: CPT | Performed by: STUDENT IN AN ORGANIZED HEALTH CARE EDUCATION/TRAINING PROGRAM

## 2024-10-29 ENCOUNTER — OFFICE VISIT (OUTPATIENT)
Dept: OBSTETRICS AND GYNECOLOGY | Facility: CLINIC | Age: 39
End: 2024-10-29
Payer: COMMERCIAL

## 2024-10-29 VITALS
SYSTOLIC BLOOD PRESSURE: 108 MMHG | HEIGHT: 62 IN | WEIGHT: 112.88 LBS | BODY MASS INDEX: 20.77 KG/M2 | DIASTOLIC BLOOD PRESSURE: 68 MMHG

## 2024-10-29 DIAGNOSIS — Z01.419 ENCOUNTER FOR ANNUAL ROUTINE GYNECOLOGICAL EXAMINATION: Primary | ICD-10-CM

## 2024-10-29 PROCEDURE — 1160F RVW MEDS BY RX/DR IN RCRD: CPT | Mod: CPTII,S$GLB,, | Performed by: OBSTETRICS & GYNECOLOGY

## 2024-10-29 PROCEDURE — 99395 PREV VISIT EST AGE 18-39: CPT | Mod: S$GLB,,, | Performed by: OBSTETRICS & GYNECOLOGY

## 2024-10-29 PROCEDURE — 1159F MED LIST DOCD IN RCRD: CPT | Mod: CPTII,S$GLB,, | Performed by: OBSTETRICS & GYNECOLOGY

## 2024-10-29 PROCEDURE — 3044F HG A1C LEVEL LT 7.0%: CPT | Mod: CPTII,S$GLB,, | Performed by: OBSTETRICS & GYNECOLOGY

## 2024-10-29 PROCEDURE — 99999 PR PBB SHADOW E&M-EST. PATIENT-LVL III: CPT | Mod: PBBFAC,,, | Performed by: OBSTETRICS & GYNECOLOGY

## 2024-10-29 PROCEDURE — 3074F SYST BP LT 130 MM HG: CPT | Mod: CPTII,S$GLB,, | Performed by: OBSTETRICS & GYNECOLOGY

## 2024-10-29 PROCEDURE — 3008F BODY MASS INDEX DOCD: CPT | Mod: CPTII,S$GLB,, | Performed by: OBSTETRICS & GYNECOLOGY

## 2024-10-29 PROCEDURE — 3078F DIAST BP <80 MM HG: CPT | Mod: CPTII,S$GLB,, | Performed by: OBSTETRICS & GYNECOLOGY

## 2024-12-01 NOTE — PROGRESS NOTES
"Chief Complaint: Well Woman Exam     HPI:      Opal Marx is a 39 y.o.  who presents today for well woman exam.  LMP: Patient's last menstrual period was 10/10/2024 (approximate).  No issues, problems, or complaints. Specifically, patient denies abnormal vaginal bleeding, discharge, pelvic pain, urinary problems, or changes in appetite. Ms. Marx is currently sexually active with a single male partner. She declines STD screening today. Patient has regular monthly menses. Patient's last menstrual period was 10/10/2024 (approximate). She is currently using condoms for contraception. Dysmenorrhea mild with no meds used.    Previous Pap: no abnormalities/HPV negative  (3/21/2023)    Gardasil: Has never had     OB History          2    Para   2    Term   2            AB        Living   2         SAB        IAB        Ectopic        Multiple        Live Births   2           Obstetric Comments   Largest 7lb4oz               GYN History  Age of Menarche:15  Age at first pregnancy:26   Age at first live birth:26  Number of months breastfeedin   Age at Menopause:    Comments: No abnormal pap or STDs       ROS:     GENERAL: Denies unintentional weight gain or weight loss. Feeling well overall.   SKIN: Denies rash or lesions.   HEENT: Denies headaches, or vision changes.   CARDIOVASCULAR: Denies palpitations or chest pain.   RESPIRATORY: Denies shortness of breath or dyspnea on exertion.  BREASTS: Denies pain, lumps, or nipple discharge.   ABDOMEN: Denies abdominal pain, constipation, diarrhea, nausea, vomiting, change in appetite.  URINARY: Denies frequency, dysuria, hematuria.  NEUROLOGIC: Denies syncope or weakness.   PSYCHIATRIC: Denies depression, anxiety or mood swings.    Physical Exam:      PHYSICAL EXAM:  /68   Ht 5' 2" (1.575 m)   Wt 51.2 kg (112 lb 14 oz)   LMP 10/10/2024 (Approximate)   BMI 20.65 kg/m²   Body mass index is 20.65 kg/m².     APPEARANCE: Well nourished, well " developed, in no acute distress.  PSYCH: Appropriate mood and affect.  SKIN: No acne or hirsutism  NECK: Neck symmetric without masses or thyromegaly  NODES: No inguinal, axillary, or supraclavicular lymph node enlargement  ABDOMEN: Soft.  No tenderness or masses.    CARDIOVASCULAR: No edema of peripheral extremities  BREASTS: Symmetrical, no skin changes or visible lesions.  No palpable masses or nipple discharge bilaterally.  PELVIC: Normal external genitalia without lesions.  Normal hair distribution.  Adequate perineal body, normal urethral meatus.  Vagina moist and well rugated without lesions or discharge.  Cervix pink, without lesions, discharge or tenderness.  No significant cystocele or rectocele.  Bimanual exam shows uterus to be normal size, regular, mobile and nontender.  Adnexa without masses or tenderness.      Assessment/Plan:     Encounter for annual routine gynecological examination      RTC 1 year    Counseling:     Patient was counseled today on current ASCCP pap guidelines, the recommendation for yearly pelvic exams, healthy diet and exercise routines, breast self awareness.She is to see her PCP for other health maintenance.     Use of the [employer] Patient Portal discussed and encouraged during today's visit.       Krissy Max MD      As of April 1, 2021, the Cures Act has been passed nationally. This new law requires that all doctors progress notes, lab results, pathology reports and radiology reports be released IMMEDIATELY to the patient in the patient portal. That means that the results are released to you at the EXACT same time they are released to me. Therefore, with all of the patients that I have I am not able to reply to each patient exactly when the results come in. So there will be a delay from when you see the results to when I see them and have time to come up with a response to send you. Also I only see these results when I am on the computer at work. So if the results come in  over the weekend or after 5 pm of a work day, I will not see them until the next business day. As you can tell, this is a challenge as a physician to give every patient the quick response they hope for and deserve. So please be patient! Thanks for understanding, Dr. Max